# Patient Record
Sex: MALE | Race: WHITE
[De-identification: names, ages, dates, MRNs, and addresses within clinical notes are randomized per-mention and may not be internally consistent; named-entity substitution may affect disease eponyms.]

---

## 2018-11-19 ENCOUNTER — HOSPITAL ENCOUNTER (OUTPATIENT)
Dept: HOSPITAL 47 - RADXRMAIN | Age: 53
Discharge: HOME | End: 2018-11-19
Payer: COMMERCIAL

## 2018-11-19 DIAGNOSIS — S93.401A: Primary | ICD-10-CM

## 2018-11-19 NOTE — XR
EXAMINATION TYPE: XR ankle complete RT, XR tibia fibula RT

 

DATE OF EXAM: 11/19/2018

 

COMPARISON: NONE

 

HISTORY: Pain

 

TECHNIQUE:  Frontal, lateral and oblique images of the right ankle are obtained.

 

COMPARISON: None.

 

FINDINGS: Minimally displaced fracture distal right fibula at the level of the ankle mortise. Associa
jeannie soft tissue swelling. No evidence for ankle mortise instability additional nondisplaced hairline 
fracture within the fibular neck. No additional fracture seen at this time.

 

IMPRESSION:  Minimally displaced fracture distal right fibula at the level of the ankle mortise. No e
vidence for ankle mortise instability additional nondisplaced hairline fracture within the fibular ne
ck.

## 2018-11-27 ENCOUNTER — HOSPITAL ENCOUNTER (EMERGENCY)
Dept: HOSPITAL 47 - EC | Age: 53
Discharge: HOME | End: 2018-11-27
Payer: COMMERCIAL

## 2018-11-27 VITALS — DIASTOLIC BLOOD PRESSURE: 99 MMHG | SYSTOLIC BLOOD PRESSURE: 147 MMHG | HEART RATE: 85 BPM

## 2018-11-27 VITALS — RESPIRATION RATE: 18 BRPM | TEMPERATURE: 97.8 F

## 2018-11-27 DIAGNOSIS — I80.291: Primary | ICD-10-CM

## 2018-11-27 DIAGNOSIS — Z91.048: ICD-10-CM

## 2018-11-27 DIAGNOSIS — F17.200: ICD-10-CM

## 2018-11-27 PROCEDURE — 99283 EMERGENCY DEPT VISIT LOW MDM: CPT

## 2018-11-27 NOTE — ED
Lower Extremity Injury HPI





- General


Chief Complaint: Extremity Injury, Lower


Stated Complaint: Blood clot in leg came from US


Source: patient


Mode of arrival: ambulatory


Limitations: no limitations





- History of Present Illness


Initial Comments: 


53-year-old male patient who had fracture of the right distal fibula on 11/19/ 2018 presented to his orthopedic specialist today for increased swelling to the 

right lower leg and foot.  Patient was sent for outpatient ultrasound and with 

abnormal findings was sent to the emergency department for further evaluation.  

Patient states that he has been having increasing swelling to the lower leg 

over the last couple of days.  States he is having pain and tenderness to the 

lower calf.  He denies any numbness or tingling to the foot.  Denies any fevers 

or chills.  Denies any new injury. Patient denies any recent rash, shortness 

breath, chest pain, abdominal pain, nausea, vomiting, diarrhea, constipation, 

back pain, numbness, tingling, dizziness, weakness, hematuria, dysuria, urinary 

urgency, urinary frequency, headache, visual changes, or any other complaints.





- Related Data


 Home Medications











 Medication  Instructions  Recorded  Confirmed


 


HYDROcodone/APAP 5-325MG [Norco 1 tab PO Q6H PRN 11/27/18 11/27/18





5-325]   








 Previous Rx's











 Medication  Instructions  Recorded


 


Ibuprofen [Motrin] 600 mg PO Q8HR PRN #30 tab 11/27/18











 Allergies











Allergy/AdvReac Type Severity Reaction Status Date / Time


 


adhesive tape Allergy  Rash/Hives Verified 11/27/18 19:21














Review of Systems


ROS Statement: 


Those systems with pertinent positive or pertinent negative responses have been 

documented in the HPI.





ROS Other: All systems not noted in ROS Statement are negative.





Past Medical History


Past Medical History: No Reported History


History of Any Multi-Drug Resistant Organisms: None Reported


Past Surgical History: Back Surgery


Past Psychological History: No Psychological Hx Reported


Smoking Status: Current every day smoker


Past Alcohol Use History: None Reported


Past Drug Use History: None Reported





General Exam


Limitations: no limitations


General appearance: alert, in no apparent distress, other (This is a well-

developed, well-nourished adult male patient in no acute distress.  Vital signs 

upon presentation are temperature 97.8F, pulse 84, respirations 18, blood 

pressure 149/84)


Eye exam: Present: normal appearance, PERRL, EOMI.  Absent: scleral icterus, 

conjunctival injection, periorbital swelling


Respiratory exam: Present: normal lung sounds bilaterally.  Absent: respiratory 

distress, wheezes, rales, rhonchi, stridor


Cardiovascular Exam: Present: regular rate, normal rhythm, normal heart sounds.

  Absent: systolic murmur, diastolic murmur, rubs, gallop, clicks


Extremities exam: Present: full ROM, tenderness (Tenderness over the distal 

calf on the right), normal capillary refill, other (Patient has nonpitting 

edema noted to the right lower leg and foot.  There is ecchymosis noted around 

the ankle.  Patient has distal calf tenderness, no erythema.  Skin is otherwise 

pink, warm, dry.  Cap refills less than 3 seconds.  Pedal pulses 2+ and equal 

bilaterally.).  Absent: normal inspection, pedal edema, joint swelling, calf 

tenderness


Neurological exam: Present: alert, oriented X3, CN II-XII intact


Psychiatric exam: Present: normal affect, normal mood


Skin exam: Present: warm, dry, intact, normal color.  Absent: rash





Course


 Vital Signs











  11/27/18 11/27/18 11/27/18





  18:21 19:05 20:50


 


Temperature 97.8 F  


 


Pulse Rate 84 80 85


 


Respiratory 18 18 18





Rate   


 


Blood Pressure 149/84 151/89 147/99


 


O2 Sat by Pulse  98 97





Oximetry   














Medical Decision Making





- Medical Decision Making


53-year-old male patient presented to the emergency department today for 

evaluation after having abnormal ultrasound of the right lower extremity.  

Physical examination did reveal increased swelling to the right leg and foot.  

Patient did have distal calf tenderness.  Neurovascular status was intact.  

Ultrasound report was reviewed and did reveal signs consistent for 

thrombophlebitis.  I did discuss this finding with the patient.  Did discuss 

use of anti-inflammatories and warm compresses to the site.  Patient does have 

Norco at home for pain control.  He is instructed to follow-up with his 

orthopedic specialist for recheck as soon as possible.  Return parameters 

discussed in detail.  He verbalizes understanding and agrees with this plan








- Radiology Data


Radiology results: report reviewed


Venous Doppler duplex of the right lower extremity was obtained.  Report was 

reviewed in its entirety.  Impression by Dr. Triplett shows the right leg 

negative for DVT.  At the area of the patient's pain the right PVTs are 

noncompressible and have no color fell.  Correlate for thrombophlebitis.





Disposition


Clinical Impression: 


 Thrombophlebitis of right lower extremity





Disposition: HOME SELF-CARE


Condition: Good


Instructions:  Superficial Thrombophlebitis (ED), Leg Edema (ED)


Additional Instructions: 


Take anti-inflammatory medication as directed.  Keep right leg elevated.  Follow

-up with Dr. Jacob for recheck as soon as possible.  Return immediately for 

any new, worsening, or concerning symptoms.


Prescriptions: 


Ibuprofen [Motrin] 600 mg PO Q8HR PRN #30 tab


 PRN Reason: Pain


Is patient prescribed a controlled substance at d/c from ED?: No


Referrals: 


Kimo Jacob MD [Medical Doctor] - 1-2 days


Time of Disposition: 20:44

## 2018-11-27 NOTE — US
EXAMINATION TYPE: US venous doppler duplex LE RT

 

DATE OF EXAM: 11/27/2018 4:19 PM

 

COMPARISON: NONE

 

CLINICAL HISTORY: M25.571 Pain in right ankle and joints of right fo. Pain and swelling right calf

 

SIDE PERFORMED: Right   

 

TECHNIQUE:  The lower extremity deep venous system is examined utilizing real time linear array sonog
chino with graded compression, doppler sonography and color-flow sonography.

 

VESSELS IMAGED:

External Iliac Vein (EIV)

Common Femoral Vein

Deep Femoral Vein

Greater Saphenous Vein *

Femoral Vein

Popliteal Vein

Small Saphenous Vein *

Proximal Calf Veins

(* superficial vessels)

 

 

 

Right Leg:  Negative for DVT. At the area of the patient's pain, the right PTVs are non-compressible 
and have no color fill.  

 

 

 

 

 

IMPRESSION: No evidence of DVT. Correlate for thrombophlebitis.

## 2018-11-28 ENCOUNTER — HOSPITAL ENCOUNTER (EMERGENCY)
Dept: HOSPITAL 47 - EC | Age: 53
Discharge: HOME | End: 2018-11-28
Payer: COMMERCIAL

## 2018-11-28 VITALS
RESPIRATION RATE: 18 BRPM | SYSTOLIC BLOOD PRESSURE: 139 MMHG | DIASTOLIC BLOOD PRESSURE: 94 MMHG | HEART RATE: 68 BPM | TEMPERATURE: 98.6 F

## 2018-11-28 DIAGNOSIS — Z91.048: ICD-10-CM

## 2018-11-28 DIAGNOSIS — F17.200: ICD-10-CM

## 2018-11-28 DIAGNOSIS — I80.3: Primary | ICD-10-CM

## 2018-11-28 DIAGNOSIS — Z87.81: ICD-10-CM

## 2018-11-28 PROCEDURE — 99283 EMERGENCY DEPT VISIT LOW MDM: CPT

## 2018-11-28 NOTE — ED
General Adult HPI





- General


Chief complaint: Recheck/Abnormal Lab/Rx


Stated complaint: Leg pain/ankle injury -[facility]


Time Seen by Provider: 11/28/18 17:16


Source: patient, RN notes reviewed


Mode of arrival: ambulatory


Limitations: no limitations





- History of Present Illness


Initial comments: 





53-year-old male presents to the emergency determine for chief complaint of 

right lower extremity pain.  Patient states this has been ongoing for the past 

10 days.  He states he had a distal fibular fracture of the right ankle 10 days 

ago.  He states he has been wearing a walking boot since that time.  Patient 

states that he has had persistent edema of the right lower leg since he broke 

the ankle.  However, patient has had increased bruising and calf pain in the 

past few days.  Patient states he had an outpatient ultrasound done and 

presented to the emergency Department for the results which were 

thrombophlebitis.  He states that since yesterday he has noticed increased 

bruising of the calf so presented to the emergency department again today.  

Patient states he has been taking Motrin and keeping his foot elevated.  He 

denies any other injuries. Patient has no other complaints at this time 

including shortness of breath, chest pain, abdominal pain, nausea or vomiting, 

headache, or visual changes.





- Related Data


 Previous Rx's











 Medication  Instructions  Recorded


 


Ibuprofen [Motrin] 600 mg PO Q8HR PRN #30 tab 11/27/18


 


Ketorolac [Toradol] 10 mg PO Q8H #15 tab 11/28/18











 Allergies











Allergy/AdvReac Type Severity Reaction Status Date / Time


 


adhesive tape Allergy  Rash/Hives Verified 11/28/18 18:10














Review of Systems


ROS Statement: 


Those systems with pertinent positive or pertinent negative responses have been 

documented in the HPI.





ROS Other: All systems not noted in ROS Statement are negative.





Past Medical History


Past Medical History: No Reported History


History of Any Multi-Drug Resistant Organisms: None Reported


Past Surgical History: Back Surgery


Past Psychological History: No Psychological Hx Reported


Smoking Status: Current every day smoker


Past Alcohol Use History: None Reported


Past Drug Use History: None Reported





General Exam


Limitations: no limitations


General appearance: alert, in no apparent distress


Head exam: Present: atraumatic, normocephalic, normal inspection


Eye exam: Present: normal appearance, PERRL, EOMI.  Absent: scleral icterus, 

conjunctival injection, periorbital swelling


ENT exam: Present: normal exam, mucous membranes moist


Neck exam: Present: normal inspection, full ROM.  Absent: tenderness, 

meningismus, lymphadenopathy


Respiratory exam: Present: normal lung sounds bilaterally.  Absent: respiratory 

distress, wheezes, rales, rhonchi, stridor


Cardiovascular Exam: Present: regular rate, normal rhythm, normal heart sounds.

  Absent: systolic murmur, diastolic murmur, rubs, gallop, clicks


GI/Abdominal exam: Present: soft, normal bowel sounds.  Absent: distended, 

tenderness, guarding, rebound, rigid


Extremities exam: Present: full ROM (Full range of motion of the right knee), 

tenderness (Mild tenderness noted to the right calf), normal capillary refill (

Capillary refill less than 2 seconds and DP pulse 2+ in the right lower 

extremity), pedal edema (Patient does have non pitting mild edema noted of the 

right foot ankle and calf.  6 cm x 6 cm area of ecchymosis noted to the right 

calf.  There is also ecchymosis on the bilateral aspects of the right foot.  No 

evidence of compartment syndrome.).  Absent: other (Negative Homans sign)





Course


 Vital Signs











  11/28/18





  16:51


 


Temperature 97.9 F


 


Pulse Rate 77


 


Respiratory 20





Rate 


 


Blood Pressure 175/87


 


O2 Sat by Pulse 98





Oximetry 














Medical Decision Making





- Medical Decision Making





53-year-old male presents to the emergency department for a chief complaint of 

right lower extremity pain.  Patient fractured his right distal fibula about 10 

days ago and has had swelling since that time.  However patient noticed 

ecchymosis and pain in the calf a few days ago and was seen here in the 

emergency department.  Ultrasound showed a thrombophlebitis without evidence 

for DVT.  Patient states the bruising has increased so he came back to the 

emergency department.  On exam there is ecchymosis noted of the right calf, no 

significant erythema or since infection noted in the right lower extremity.  No 

evidence of a compartment syndrome.  Neurovascular intact.  Discussed the 

patient that this is likely a symptom of thrombophlebitis and at this point 

repeating ultrasound will not yield much additional information.  Discussed 

trying Toradol and warm compresses.  Patient will follow up with primary care 

at his appointment next week as well as orthopedics.  Discussed returning here 

if patient has worsening symptoms in the next few days.  Discussed with Dr. Agee.





Disposition


Clinical Impression: 


 Thrombophlebitis of right lower extremity





Disposition: HOME SELF-CARE


Condition: Good


Instructions:  Superficial Thrombophlebitis (ED)


Additional Instructions: 


Please take Toradol as directed.  Use warm compresses of the right lower 

extremity.  Keep the leg elevated to help with swelling.  Follow-up with 

primary care or orthopedics in the next 1-2 days.  Return to the emergency 

department if you have worsening symptoms.  


Prescriptions: 


Ketorolac [Toradol] 10 mg PO Q8H #15 tab


Is patient prescribed a controlled substance at d/c from ED?: No


Referrals: 


Cash Solis DO [Primary Care Provider] - 1-2 days


Time of Disposition: 18:47

## 2022-01-31 ENCOUNTER — HOSPITAL ENCOUNTER (EMERGENCY)
Dept: HOSPITAL 47 - EC | Age: 57
Discharge: HOME | End: 2022-01-31
Payer: COMMERCIAL

## 2022-01-31 VITALS
HEART RATE: 80 BPM | SYSTOLIC BLOOD PRESSURE: 114 MMHG | RESPIRATION RATE: 20 BRPM | DIASTOLIC BLOOD PRESSURE: 70 MMHG | TEMPERATURE: 98.2 F

## 2022-01-31 DIAGNOSIS — I10: ICD-10-CM

## 2022-01-31 DIAGNOSIS — U07.1: Primary | ICD-10-CM

## 2022-01-31 DIAGNOSIS — F17.200: ICD-10-CM

## 2022-01-31 DIAGNOSIS — J44.9: ICD-10-CM

## 2022-01-31 PROCEDURE — 99284 EMERGENCY DEPT VISIT MOD MDM: CPT

## 2022-01-31 PROCEDURE — 96372 THER/PROPH/DIAG INJ SC/IM: CPT

## 2022-01-31 PROCEDURE — 87635 SARS-COV-2 COVID-19 AMP PRB: CPT

## 2022-01-31 NOTE — ED
General Adult HPI





- General


Chief complaint: Headache


Stated complaint: Headache,Fever


Time Seen by Provider: 01/31/22 19:58


Source: patient, family, RN notes reviewed, old records reviewed


Mode of arrival: ambulatory


Limitations: no limitations





- History of Present Illness


Initial comments: 





Patient is a 56-year-old male with past medical history remarkable for COPD and 

hypertension who was not vaccinated for COVID-19 who presents emergency 

department complaining of a mild tension-like headache, subjective fevers, 

generalized joint pain, as well as a positive home Covid swab.  He seeks 

evaluation.  Denies any chest pain, shortness breath, rhinorrhea, cough.  Denies

any nausea, vomiting, loss of taste or smell.  Denies any diarrhea.  Has no 

other acute once at this time.  Symptoms started yesterday.  Home test was 

positive today.  I evaluated the patient when he was placed in a room.





- Related Data


                                Home Medications











 Medication  Instructions  Recorded  Confirmed


 


Losartan Potassium [Cozaar] 25 mg PO DAILY 01/12/22 01/12/22








                                  Previous Rx's











 Medication  Instructions  Recorded


 


Albuterol Inhaler [Ventolin Hfa 1 puff INHALATION RT-QID #8 gm 01/31/22





Inhaler]  











                                    Allergies











Allergy/AdvReac Type Severity Reaction Status Date / Time


 


adhesive tape Allergy  Rash/Hives Verified 01/31/22 19:48














Review of Systems


ROS Statement: 


Those systems with pertinent positive or pertinent negative responses have been 

documented in the HPI.


Review of Systems:


CONST: Denies fever 


EYES: Denies blurry vision 


ENT: Denies nasal congestion  


C/V:  Denies Chest pain


RESP: Denies shortness of breath 


GI: Denies abdominal pain 


: Denies dysuria  


SKIN: Denies rash.


MSK: Endorses generalized joint pain.


NEURO: Endorses mild tension-like headache


ROS Other: All systems not noted in ROS Statement are negative.





Past Medical History


Past Medical History: COPD, Hypertension


History of Any Multi-Drug Resistant Organisms: None Reported


Past Surgical History: Back Surgery


Past Anesthesia/Blood Transfusion Reactions: No Reported Reaction


Past Psychological History: No Psychological Hx Reported


Smoking Status: Current some day smoker


Past Alcohol Use History: None Reported


Past Drug Use History: None Reported





- Past Family History


  ** Mother


Family Medical History: Cancer





  ** Father


Family Medical History: Congestive Heart Failure (CHF)





General Exam





- General Exam Comments


Initial Comments: 





General: Appears in no acute distress.


HEAD:  Normal with no signs of head trauma.


EYES:  PERRLA, EOMI, conjunctiva normal, no discharge.


ENT:  Hearing grossly intact, normal oropharynx.


RESPIRATORY:  Clear breath sounds bilaterally.  No wheezes, rales, or rhonchi.  

No increased work of breathing.  Not hypoxic.


C/V:  Regular rate and rhythm. S1 and S2 auscultated, no edema, peripheral 

pulses 2+ and intact throughout


ABD:  Abd is soft, nontender, nondistended


EXT: Normal range of motion, no obvious deformity


SKIN:  No rashes or lesions observed on exposed skin.


NEURO: Alert and oriented 4.


Limitations: no limitations





Course





                                   Vital Signs











  01/31/22





  19:43


 


Temperature 98.3 F


 


Pulse Rate 92


 


Respiratory 22





Rate 


 


Blood Pressure 151/79


 


O2 Sat by Pulse 96





Oximetry 














Medical Decision Making





- Medical Decision Making





Based on the patient's presentation and physical exam, I'm concerned for Covid 

19 infection.  Covid swab is positive.  He is not hypoxic and in no respiratory 

distress.  I do not believe that it he requires any further laboratory studies 

or imaging at this time.  Patient does meet criteria for monoclonal antibody 

therapy.  I discussed it with him and he did consent to treatment.  We discussed

quarantine.  Discussed over-the-counter vitamin supplementations.  He was in 

agreement this plan.





Patient tolerated monoclonal antibody therapy well.  He'll be discharged home at

this time.Inform the patient that he can use over-the-counter analgesia for 

joint pain.  Patient's headache is improved at this time as well.  Patient will 

be discharged home.





I will provide the patient with a prescription for albuterol inhaler. I 

instructed the patient to follow up with their PCP in the next 3 days.  .  I 

explained that the patient should return to the emergency department if they 

experience any worsening symptoms. Strict return precautions were discussed with

the patient. The patient expressed understanding of these instructions. I 

answered all questions that the patient had. The patient was discharged home in 

good condition with their prescriptions and follow up information.





- Lab Data





                                   Lab Results











  01/31/22 Range/Units





  19:54 


 


Coronavirus (PCR)  Detected A  (Not Detectd)  














Disposition


Clinical Impression: 


 COVID-19 virus infection





Disposition: HOME SELF-CARE


Condition: Good


Instructions (If sedation given, give patient instructions):  Coronavirus 

Disease 2019 (COVID-19)


Prescriptions: 


Albuterol Inhaler [Ventolin Hfa Inhaler] 1 puff INHALATION RT-QID #8 gm


Is patient prescribed a controlled substance at d/c from ED?: No


Referrals: 


Cash Solis DO [Primary Care Provider] - 1-2 days

## 2024-09-24 ENCOUNTER — HOSPITAL ENCOUNTER (OUTPATIENT)
Dept: HOSPITAL 47 - RADMRIMAIN | Age: 59
Discharge: HOME | End: 2024-09-24
Attending: ORTHOPAEDIC SURGERY
Payer: COMMERCIAL

## 2024-09-24 DIAGNOSIS — M43.16: ICD-10-CM

## 2024-09-24 DIAGNOSIS — M43.8X6: ICD-10-CM

## 2024-09-24 DIAGNOSIS — M47.816: ICD-10-CM

## 2024-09-24 DIAGNOSIS — M51.27: Primary | ICD-10-CM

## 2024-09-24 DIAGNOSIS — M99.73: ICD-10-CM

## 2024-09-24 PROCEDURE — 72148 MRI LUMBAR SPINE W/O DYE: CPT

## 2024-09-24 NOTE — MR
EXAMINATION TYPE: MR lumbar spine wo con

 

DATE OF EXAM: 9/24/2024

 

COMPARISON: None

 

HISTORY: low back pain for 2.5 months, history of surgery 22 years ago

 

CONTRAST: 0 mL intravenous Gadavist.

 

TECHNIQUE: 

Multiplanar, multisequence images of the lumbar spine were acquired.

 

FINDINGS:  There are terminates at the L1 level.

 

L5-S1: There is narrowing of disc height this level. Focal disc herniation or significant disc bulge.
 No spinal canal stenosis stenosis. Moderate to severe foraminal narrowing is present

 

L4-L5: There is a grade 1 spondylolisthesis of L4 anterior on L5. Spondylolysis of L4 may be present.
 Postsurgical embolectomy changes appear to be present. Facet hypertrophy as right posterior lateral 
thecal sac compression. Severe right and moderate to severe left foraminal stenosis present. Disc parth
iccation is present.

 

L3-L4: No focal disc herniation or significant disc bulge. No spinal canal stenosis. Ligamentum flavu
m laxity has mild posterior lateral thecal sac compression. Neural foramen are patent.

 

L2-L3: Broad-based disc bulge is present. No AP spinal canal stenosis is present. Facet hypertrophy i
s ligamentum flavum laxity and mild posterior lateral thecal sac compression. Neural foramen are gordillo
nt.

 

L1-L2: No significant disc bulge or disc herniation.  No spinal canal stenosis.  No foraminal stenosi
s. 

 

T12-L1: No significant disc bulge or disc herniation.  No spinal canal stenosis.  No foraminal stenos
is.

 

IMPRESSION:

1. Grade 1 spondylolisthesis of L4 anteriorly on L5.

2. Facet hypertrophy and ligamentum flavum laxity to the mid and lower lumbar spine. This has mild po
sterior lateral thecal sac compression. Moderate right posterior lateral thecal sac compression at L4
-5 is present.

3. Multilevel foraminal stenosis produces moderate to severe L5-S1, severe on the right at L4-5 and m
ore moderate on the left at L4-5.

 

[facility], Workstation: [id number], 9/24/2024 11:00 PM